# Patient Record
Sex: MALE
[De-identification: names, ages, dates, MRNs, and addresses within clinical notes are randomized per-mention and may not be internally consistent; named-entity substitution may affect disease eponyms.]

---

## 2018-01-10 ENCOUNTER — HOSPITAL ENCOUNTER (EMERGENCY)
Dept: HOSPITAL 14 - H.ER | Age: 58
LOS: 1 days | Discharge: ANOTHER HOSPITAL | End: 2018-01-11
Payer: COMMERCIAL

## 2018-01-10 DIAGNOSIS — Z79.4: ICD-10-CM
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
## 2018-01-10 DIAGNOSIS — S72.401A: Primary | ICD-10-CM

## 2018-01-10 DIAGNOSIS — E11.9: ICD-10-CM
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
## 2018-01-10 LAB
APTT BLD: 25.1 SECONDS (ref 25.6–37.1)
BASOPHILS # BLD AUTO: 0 K/UL (ref 0–0.2)
BASOPHILS NFR BLD: 0.3 % (ref 0–2)
BUN SERPL-MCNC: 22 MG/DL (ref 9–20)
CALCIUM SERPL-MCNC: 9.4 MG/DL (ref 8.4–10.2)
EOSINOPHIL # BLD AUTO: 0.1 K/UL (ref 0–0.7)
EOSINOPHIL NFR BLD: 0.5 % (ref 0–4)
ERYTHROCYTE [DISTWIDTH] IN BLOOD BY AUTOMATED COUNT: 14.9 % (ref 11.5–14.5)
GFR NON-AFRICAN AMERICAN: > 60
HGB BLD-MCNC: 14.5 G/DL (ref 12–18)
INR PPP: 1 (ref 0.9–1.2)
LYMPHOCYTES # BLD AUTO: 1.5 K/UL (ref 1–4.3)
LYMPHOCYTES NFR BLD AUTO: 11.6 % (ref 20–40)
MCH RBC QN AUTO: 26.4 PG (ref 27–31)
MCHC RBC AUTO-ENTMCNC: 32 G/DL (ref 33–37)
MCV RBC AUTO: 82.7 FL (ref 80–94)
MONOCYTES # BLD: 0.8 K/UL (ref 0–0.8)
MONOCYTES NFR BLD: 6.1 % (ref 0–10)
NEUTROPHILS # BLD: 10.7 K/UL (ref 1.8–7)
NEUTROPHILS NFR BLD AUTO: 81.5 % (ref 50–75)
NRBC BLD AUTO-RTO: 0 % (ref 0–0)
PLATELET # BLD: 235 K/UL (ref 130–400)
PMV BLD AUTO: 8.8 FL (ref 7.2–11.7)
PROTHROMBIN TIME: 10.7 SECONDS (ref 9.8–13.1)
RBC # BLD AUTO: 5.5 MIL/UL (ref 4.4–5.9)
WBC # BLD AUTO: 13.1 K/UL (ref 4.8–10.8)

## 2018-01-10 PROCEDURE — 73552 X-RAY EXAM OF FEMUR 2/>: CPT

## 2018-01-10 PROCEDURE — 86850 RBC ANTIBODY SCREEN: CPT

## 2018-01-10 PROCEDURE — 80048 BASIC METABOLIC PNL TOTAL CA: CPT

## 2018-01-10 PROCEDURE — 96375 TX/PRO/DX INJ NEW DRUG ADDON: CPT

## 2018-01-10 PROCEDURE — 86900 BLOOD TYPING SEROLOGIC ABO: CPT

## 2018-01-10 PROCEDURE — 85610 PROTHROMBIN TIME: CPT

## 2018-01-10 PROCEDURE — 99285 EMERGENCY DEPT VISIT HI MDM: CPT

## 2018-01-10 PROCEDURE — 73562 X-RAY EXAM OF KNEE 3: CPT

## 2018-01-10 PROCEDURE — 85025 COMPLETE CBC W/AUTO DIFF WBC: CPT

## 2018-01-10 PROCEDURE — 29530 STRAPPING OF KNEE: CPT

## 2018-01-10 PROCEDURE — 93005 ELECTROCARDIOGRAM TRACING: CPT

## 2018-01-10 PROCEDURE — 71045 X-RAY EXAM CHEST 1 VIEW: CPT

## 2018-01-10 PROCEDURE — 73610 X-RAY EXAM OF ANKLE: CPT

## 2018-01-10 PROCEDURE — 96374 THER/PROPH/DIAG INJ IV PUSH: CPT

## 2018-01-10 PROCEDURE — 72170 X-RAY EXAM OF PELVIS: CPT

## 2018-01-10 PROCEDURE — 85730 THROMBOPLASTIN TIME PARTIAL: CPT

## 2018-01-10 NOTE — RAD
EXAM:

  XR Right Knee, 3 views



CLINICAL HISTORY:

  57 years old, male; Injury or trauma; Fall; Initial encounter; Blunt trauma; 

Knee; Right; Prior surgery; Surgery date: 6+ months; Surgery type: Rt. Knee 

@2013; Additional info: S/P slip and fall



TECHNIQUE:

  Three views of the right knee.



COMPARISON:

  No relevant prior studies available.



FINDINGS:

  Bones/joints:  There is an comminuted, displaced distal femoral fracture. The 

oblique fracture extends from the metadiaphysis to the metaphysis with 

overlapping fracture fragments. Surgical plate and screws transfix the proximal 

tibia.  No dislocation.

  Soft tissues: Soft tissue swelling.



IMPRESSION:     

Comminuted displaced distal femoral fracture with overlying fracture fragments

## 2018-01-10 NOTE — ED PDOC
Lower Extremity Pain/Injury


Time Seen by Provider: 01/10/18 20:04


Chief Complaint (Nursing): Lower Extremity Problem/Injury


Chief Complaint (Provider): Lower Extremity Problem/Injury


History Per: Patient


History/Exam Limitations: no limitations


Onset/Duration Of Symptoms: Mins (prior to arrival)


Current Symptoms Are (Timing): Still Present


Additional Complaint(s): 





57 year old male with previous medical history of diabetes, who presents to the 

emergency department with a complaint of right-sided knee and ankle pain status 

post slipping on water and "twisting leg backwards" prior to arrival. Patient 

stated that he can hear crunching noise when walking but experience no pain at 

rest.





PMD: Judah Marie MD





Past Medical History


Reviewed: Historical Data, Nursing Documentation, Vital Signs


Vital Signs: 





 Last Vital Signs











Temp  98.2 F   01/10/18 19:29


 


Pulse  99 H  01/10/18 19:29


 


Resp  16   01/10/18 19:29


 


BP  155/90 H  01/10/18 19:29


 


Pulse Ox  99   01/10/18 19:29














- Medical History


PMH: Diabetes





- Surgical History


Surgical History: 


   Denies: No Surg Hx


Other surgeries: right knee





- Family History


Family History: States: Unknown Family Hx





- Home Medications


Home Medications: 


 Ambulatory Orders











 Medication  Instructions  Recorded


 


Insulin Glargine,Hum.rec.anlog 40 units SUBCUT Q12 01/10/18





[Basaglar Kwikpen U-100]  


 


Liraglutide [Victoza 2-Beck] 1.8 mg SUBCUT DAILY 01/10/18














- Allergies


Allergies/Adverse Reactions: 


 Allergies











Allergy/AdvReac Type Severity Reaction Status Date / Time


 


No Known Allergies Allergy   Verified 01/10/18 19:29














Review of Systems


ROS Statement: Except As Marked, All Systems Reviewed And Found Negative


Musculoskeletal: Positive for: Leg Pain (right-sided knee and ankle)





Physical Exam





- Reviewed


Nursing Documentation Reviewed: Yes


Vital Signs Reviewed: Yes





- Physical Exam


Extremity: Positive for: Tenderness (right-sided popliteal fossa and bilateral 

malleolus), Swelling (right knee significantly), Other (bilateral patella and 

neurovascularly intact with chronic venous stasis of right ankle).  Negative for

: Normal ROM, Deformity


Neurologic/Psych: Positive for: Alert, CNs II-XII (intact), Oriented.  Negative 

for: Motor/Sensory Deficits





- Laboratory Results


Result Diagrams: 


 01/10/18 22:00





 01/10/18 21:20





- ECG


O2 Sat by Pulse Oximetry: 99 (RA)


Pulse Ox Interpretation: Normal





Medical Decision Making


Medical Decision Making: 





Initial Impression: Ligamentous strain vs. tear





Initial Plan:    


* Xray knee (right)


* Flexeril 10mg PO


* Toradol 30mg iM


* Xray ankle (right)


________________________________________________________________________________

____





Time: 2010


--Equivocal valgus and varus tests secondary to significant swelling. 





Time: 2200


--Discussed case with Dr. Avila, ortho on-call, who recommended ortho-

trauma specialty services that are not available at [facility]. Recommended transfer 

to [facility].


--Upon re-evaluation, patient is neurovascularly intact with good distal pulses.





2300


Intact pulses


Patient refusing transfer to [facility], requesting transfer to [facility]





0000


Pulses remain intact


Spoke with [facility] who accepts transfer, patient was able to tolerate knee 

immobilizer, states he felt "better" when leg was lengthened and pulled.  





--------------------------------------------------------------------------------

-----------------


Scribe Attestation:


Documented by Cassidy Clifton, acting as a scribe for Preet Landeros MD.





Provider Scribe Attestation:


All medical record entries made by the Scribe were at my direction and 

personally dictated by me. I have reviewed the chart and agree that the record 

accurately reflects my personal performance of the history, physical exam, 

medical decision making, and the department course for this patient. I have 

also personally directed, reviewed, and agree with the discharge instructions 

and disposition





Disposition





- Clinical Impression


Clinical Impression: 


 Femoral distal fracture








- Patient ED Disposition


Is Patient to be Admitted: Transfer of Care





- Disposition


Disposition: Other Institution ([facility])


Disposition Time: 02:33


Condition: IMPROVED


Forms:  [facility] (English)

## 2018-01-11 VITALS — OXYGEN SATURATION: 99 %

## 2018-01-11 VITALS
HEART RATE: 88 BPM | SYSTOLIC BLOOD PRESSURE: 125 MMHG | DIASTOLIC BLOOD PRESSURE: 98 MMHG | TEMPERATURE: 98.1 F | RESPIRATION RATE: 18 BRPM

## 2018-01-11 NOTE — RAD
PROCEDURE:  Right Ankle Radiographs.



HISTORY:

s/p slip and fall  



COMPARISON:

None



FINDINGS:



BONES:

No acute fracture. 



JOINTS:

Ankle mortise maintained. Talar dome intact



SOFT TISSUES:

Lateral malleolar soft tissue swelling. 



OTHER FINDINGS:

Small Achilles enthesophyte.



IMPRESSION:

Lateral malleolar soft tissue swelling without demonstrated fracture 

or dislocation.

## 2018-01-11 NOTE — RAD
PROCEDURE:  CHEST RADIOGRAPH, 1 VIEW



HISTORY:

s/p fall



COMPARISON:

Chest radiograph dated 03/24/2013



FINDINGS:



LUNGS:

Clear.



PLEURA:

No pneumothorax or pleural fluid seen.



CARDIOVASCULAR:

Normal.



OSSEOUS STRUCTURES:

No significant abnormalities.



VISUALIZED UPPER ABDOMEN:

Normal.



OTHER FINDINGS:

None. 



IMPRESSION:

No active disease.

## 2018-01-11 NOTE — CARD
--------------- APPROVED REPORT --------------





EKG Measurement

Heart [id number]

NE [id number]

[id number]

[id number]

[id number]



<Conclusion>

Normal sinus rhythm

Nonspecific T wave abnormality

Abnormal ECG

## 2018-01-11 NOTE — RAD
PROCEDURE:  Right Femur Radiographs.



HISTORY:

fracture



COMPARISON:

None.



TECHNIQUE:

AP and Lateral Radiographs of the right femur.



FINDINGS:



FEMUR:

Comminuted distal femoral fracture with posterior distraction of the 

distal fracture segment. Partially imaged proximal tibial orthopedic 

hardware.



SOFT TISSUES:

Soft tissue swelling. 



OTHER FINDINGS:

None.



IMPRESSION:

Comminuted, posteriorly distracted distal femoral fracture.

## 2018-01-11 NOTE — RAD
PROCEDURE:  Radiographs of the pelvis.



HISTORY:

s/p fall



COMPARISON:

None.



FINDINGS:



BONES:

Pelvic Bones: Unremarkable.



Hips: Joint space narrowing.



JOINTS:

Sacroiliac Joints: Unremarkable.



Pubic Symphysis: Degenerative.



OTHER FINDINGS:

Retrievable type inferior vena cava filter.



IMPRESSION:

No demonstrated fracture or dislocation.

## 2018-09-12 ENCOUNTER — NEW PATIENT (OUTPATIENT)
Dept: URBAN - METROPOLITAN AREA CLINIC 73 | Facility: CLINIC | Age: 58
End: 2018-09-12

## 2018-09-12 DIAGNOSIS — E11.3313: ICD-10-CM

## 2018-09-12 PROCEDURE — G9903 PT SCRN TBCO ID AS NON USER: HCPCS

## 2018-09-12 PROCEDURE — 92235 FLUORESCEIN ANGRPH MLTIFRAME: CPT

## 2018-09-12 PROCEDURE — G8397 DIL MACULA/FUNDUS EXAM/W DOC: HCPCS

## 2018-09-12 PROCEDURE — 1036F TOBACCO NON-USER: CPT

## 2018-09-12 PROCEDURE — 4040F PNEUMOC VAC/ADMIN/RCVD: CPT | Mod: 8P

## 2018-09-12 PROCEDURE — 5010F MACUL RESULT PHY/QHP MNG DM: CPT

## 2018-09-12 PROCEDURE — 92250 FUNDUS PHOTOGRAPHY W/I&R: CPT

## 2018-09-12 PROCEDURE — 2022F DILAT RTA XM EVC RTNOPTHY: CPT

## 2018-09-12 PROCEDURE — 92134 CPTRZ OPH DX IMG PST SGM RTA: CPT

## 2018-09-12 PROCEDURE — 92225 OPHTHALMOSCOPY (INITIAL): CPT

## 2018-09-12 PROCEDURE — G8427 DOCREV CUR MEDS BY ELIG CLIN: HCPCS

## 2018-09-12 PROCEDURE — 2021F DILAT MACULAR EXAM DONE: CPT

## 2018-09-12 PROCEDURE — 99244 OFF/OP CNSLTJ NEW/EST MOD 40: CPT

## 2018-09-12 ASSESSMENT — VISUAL ACUITY
OS_AM: 20/25
OS_SC: 20/50
OD_SC: 20/25-1

## 2018-09-12 ASSESSMENT — TONOMETRY
OS_IOP_MMHG: 18
OD_IOP_MMHG: 18

## 2019-03-27 ENCOUNTER — FOLLOW UP (OUTPATIENT)
Dept: URBAN - METROPOLITAN AREA CLINIC 73 | Facility: CLINIC | Age: 59
End: 2019-03-27

## 2019-03-27 DIAGNOSIS — E11.3313: ICD-10-CM

## 2019-03-27 PROCEDURE — 92226 OPHTHALMOSCOPY (SUB): CPT

## 2019-03-27 PROCEDURE — 92134 CPTRZ OPH DX IMG PST SGM RTA: CPT

## 2019-03-27 PROCEDURE — 92014 COMPRE OPH EXAM EST PT 1/>: CPT

## 2019-03-27 ASSESSMENT — VISUAL ACUITY
OD_SC: 20/25-1
OS_SC: 20/20-1